# Patient Record
Sex: FEMALE | URBAN - METROPOLITAN AREA
[De-identification: names, ages, dates, MRNs, and addresses within clinical notes are randomized per-mention and may not be internally consistent; named-entity substitution may affect disease eponyms.]

---

## 2022-08-26 ENCOUNTER — APPOINTMENT (RX ONLY)
Dept: URBAN - METROPOLITAN AREA CLINIC 101 | Facility: CLINIC | Age: 62
Setting detail: DERMATOLOGY
End: 2022-08-26

## 2022-08-26 VITALS — HEART RATE: 68 BPM | SYSTOLIC BLOOD PRESSURE: 124 MMHG | DIASTOLIC BLOOD PRESSURE: 68 MMHG

## 2022-08-26 DIAGNOSIS — Z41.9 ENCOUNTER FOR PROCEDURE FOR PURPOSES OTHER THAN REMEDYING HEALTH STATE, UNSPECIFIED: ICD-10-CM

## 2022-08-26 DIAGNOSIS — Z41.1 ENCOUNTER FOR COSMETIC SURGERY: ICD-10-CM

## 2022-08-26 PROCEDURE — ? BOTOX

## 2022-08-26 PROCEDURE — ? ADDITIONAL NOTES

## 2022-08-26 PROCEDURE — ? BOTOX (U OR CC)

## 2022-08-26 PROCEDURE — ? DVT RISK ASSESSMENT

## 2022-08-26 NOTE — PROCEDURE: ADDITIONAL NOTES
Render Risk Assessment In Note?: yes
Additional Notes: Patient's concerns include: \\n-  Excess skin/fullness to proximal bilateral upper arms. \\n-  Rhytides to forehead, glabella, and lateral canthi. \\n\\nFactors altering surgical decisions (hx/exam findings): \\n-  Patient is a poor historian. Multiple surgeries with Dr. Roberts Nissen in the past. She is unaware if she had liposuction to the upper arms or not, but has incisional scars at the ulnar side of her elbows suggestive of prior liposuction. \\n-  On exam:  mild-moderate excess skin and minor fullness primarily located to the bilateral proximal upper arm/axilla. Minimal adiposity. \\n-  Reviewed post-op recovery, activity restrictions, and surgical risks including infection, bleeding, injury to adjacent anatomic structures/nerves, numbness, hypertrophic or widened scars, incomplete treatment of skin laxity. \\n-  Discussed that she would be a candidate for a short scar brachioplasty, but she needed to be aware that this will primarily treat the axillary and proximal upper arm region with more modest effects along the length of the upper arm. She notes that she is aware of this and would prefer this if possible to avoid the scar burden typically associated with a traditional brachioplasty. \\n\\nProposed intervention(s):\\n-  Bilateral brachioplasty (short-scar technique).

## 2022-08-26 NOTE — PROCEDURE: BOTOX (U OR CC)
Glabellar Complex Units: 0
Reconstitution Date: 07/21/2022
Detail Level: Detailed
Dilution (U/0.1 Cc): 4
Consent: Written consent was obtained prior to the procedure. Risks, benefits, expectations and alternatives were discussed including, but not limited to, infection, bleeding, lid/brow ptosis, bruising, swelling, diplopia, temporary effects, incomplete chemical denervation and dissatisfaction with the cosmetic outcome. No guarantee or warranty was given or implied regarding longevity of results.
Postcare Instructions: Patient instructed to not lie down for 4 hours and limit physical activity for 24 hours. Patient instructed not to travel by airplane for 48 hours.

## 2022-08-26 NOTE — HPI: BRACHIOPLASTY CONSULTATION
Is This A New Presentation, Or A Follow-Up?: Brachioplasty Consultation
How Severe Is It?: mild
How Much Weight (In Lbs.) Have You Lost?: [address]
actual

## 2022-08-26 NOTE — PROCEDURE: BOTOX
Nasal Root Units: 0
Administered By (Optional): Luly Zambrano MD
Additional Area 1 Units: [address]
Map Statment: See attached map for complete details
Postcare Instructions: Patient instructed to not lie down for 4 hours and limit physical activity for 24 hours. Patient instructed not to travel by airplane for 48 hours.
Consent: Written consent was obtained prior to the procedure. Risks, benefits, expectations and alternatives were discussed including, but not limited to, infection, bleeding, lid/brow ptosis, bruising, swelling, diplopia, temporary effects, incomplete chemical denervation and dissatisfaction with the cosmetic outcome. No guarantee or warranty was given or implied regarding longevity of results.
Show Inventory Tab: Show
Detail Level: Detailed
Dilution (U/0.1 Cc): 4
Additional Area 1 Location: view note
Bill Summary Price Listed Below, Or Bill Total Of Units X Price Per Unit?: Bill Summary Price Below
Use Map Statement For Sites (Optional): No

## 2022-09-02 ENCOUNTER — APPOINTMENT (RX ONLY)
Dept: URBAN - METROPOLITAN AREA CLINIC 101 | Facility: CLINIC | Age: 62
Setting detail: DERMATOLOGY
End: 2022-09-02

## 2022-09-02 DIAGNOSIS — Z41.9 ENCOUNTER FOR PROCEDURE FOR PURPOSES OTHER THAN REMEDYING HEALTH STATE, UNSPECIFIED: ICD-10-CM

## 2022-09-02 PROCEDURE — ? FILLERS

## 2022-09-02 PROCEDURE — ? PDO THREAD LIFT

## 2022-09-02 NOTE — PROCEDURE: PDO THREAD LIFT
Anesthesia Method: local infiltration
Number Of Threads: 8
Detail Level: Zone
Thread Type (Optional): PDO lip screw
Thread Size (Optional): 26g 50mm
Number Of Threads: 4
Thread Type (Optional): PDO spec Multi
Thread Size (Optional): 23g 50mm
Anesthesia Type: 1% lidocaine without epinephrine
Postcare Statement Text: There were no complications and the patient was given postcare instructions and ice packs.
Consent: The risks and benefits of the procedure were discussed with the patient. Specifically the risks of swelling, bruising, bleeding, discomfort, infection, damage to nerves, numbness, allergic reactions, pigment changes, partial correction, rippling or dimpling, asymmetry, redness, bumps or nodules, visibility of threads, and scarring were reviewed. After this, consent was obtained.
Post-Care Instructions (For The Patient Hand-Out): I reviewed with the patient in detail post-care instructions. Patient should apply ice packs multiple times a day for a couple days. They were instructed to call if they have any problems.
Thread Type (Optional): PDO lip mono
Price (Use Numbers Only, No Special Characters Or $): [facility] 7575
Total Anesthesia Volume In Cc (Optional): 0
Pre-Procedure Text: The treatment areas were cleaned with alcohol and chlorhexidine. Insertion were mapped out with the Silhouette ruler and marked with a surgical marker.
Procedure Text: An  needle was used to create the insertions points and the Threads needles with absorbable sutures were inserted subcutaneously in each direction.

## 2022-09-02 NOTE — PROCEDURE: FILLERS
Additional Area 2 Volume In Cc: 0
Number Of Syringes (Required For Inventory): 1
Anesthesia Type: 1% lidocaine with epinephrine
Include Cannula Information In Note?: No
Inventory Information: This plan will send filler information to inventory based on the fillers you select. Multiple fillers can be sent but you must ensure you select the appropriate fillers in the inventory tab.
Detail Level: Simple
Anesthesia Volume In Cc: 0.5
Consent: Written consent obtained. Risks include but not limited to bruising, beading, irregular texture, ulceration, infection, allergic reaction, scar formation, incomplete augmentation, temporary nature, procedural pain.
Show Inventory Tab: Show
Post-Care Instructions: Patient instructed to apply ice to reduce swelling.
Additional Anesthesia Volume In Cc: 6
Additional Area 1 Location: right knee
Additional Area 2 Location: left knee
Filler: Juvederm Ultra Plus XC
Additional Area 3 Location: right buttock
Map Statment: See [address] for Complete Details
Additional Area 4 Location: left buttock